# Patient Record
Sex: MALE | Race: WHITE
[De-identification: names, ages, dates, MRNs, and addresses within clinical notes are randomized per-mention and may not be internally consistent; named-entity substitution may affect disease eponyms.]

---

## 2021-09-27 ENCOUNTER — HOSPITAL ENCOUNTER (EMERGENCY)
Dept: HOSPITAL 60 - LB.ED | Age: 51
Discharge: HOME | End: 2021-09-27
Payer: COMMERCIAL

## 2021-09-27 DIAGNOSIS — M54.9: Primary | ICD-10-CM

## 2021-09-27 NOTE — EDM.PDOC
ED HPI GENERAL MEDICAL PROBLEM





- General


Chief Complaint: General


Stated Complaint: back pain


Time Seen by Provider: 09/27/21 18:12


Source of Information: Reports: Patient


History Limitations: Reports: No Limitations





- History of Present Illness


INITIAL COMMENTS - FREE TEXT/NARRATIVE: 





48-year-old male presents to the ED complaining of sciatic pain and spasms.  

Patient has been doing some yard work down in [place] approximately 5 days ago 

began to have a flare-up of his sciatica.  Patient relates that he had it was a 

gradual onset, massage/chiropractic improved the pain, wearing his gun belt 

increases the pain.  Patient describes the pain as a pulling and a stabbing 

sensation.  Pain radiates down the thigh to just above his knee.  He describes 

the pain as an 8/10 on the pain scale.  Positive for: Large wallet worn in the 

back pocket, spasm.  Negative for: Chest pain, shortness of breath, syncope/near

syncope, trauma, nausea/vomiting, diaphoresis, or neurological deficits.  

Patient relates that he does not know when the spasms will kick in but they are 

not a continual problem but intermittent.





- Related Data


Home Meds: 


                                    Home Meds





methocarbamoL [Methocarbamol] 500 mg PO QID #9 tablet 09/27/21 [Rx]


methylPREDNISolone [Medrol Dose Pack] 84 mg PO ASDIRECTED 6 Days #21 dospk 

09/27/21 [Rx]











Past Medical History


Musculoskeletal History: Reports: Back Pain, Chronic





Social & Family History





- Tobacco Use


Tobacco Use Status *Q: Never Tobacco User





ED ROS GENERAL





- Review of Systems


Review Of Systems: See Below


Constitutional: Reports: Other (Discontinued alcohol 1 year)


HEENT: Reports: No Symptoms


Respiratory: Reports: No Symptoms


Cardiovascular: Reports: No Symptoms


Endocrine: Reports: No Symptoms


GI/Abdominal: Reports: No Symptoms


: Reports: No Symptoms


Musculoskeletal: Reports: Back Pain


Skin: Reports: No Symptoms


Neurological: Reports: No Symptoms


Psychiatric: Reports: No Symptoms


Hematologic/Lymphatic: Reports: No Symptoms


Immunologic: Reports: No Symptoms





ED EXAM, GENERAL





- Physical Exam


Exam: See Below


Free Text/Narrative:: 





48-year-old male presents in the ED sitting at the edge of the stretcher in no 

apparent distress.  Patient is alert and oriented 3/3 GCS 4 5 6 skin is pink 

warm and dry.  Patient is speaking in full sentences


Exam Limited By: No Limitations


General Appearance: Alert, WD/WN, No Apparent Distress


Eye Exam: Bilateral Eye: EOMI, PERRL


Ears: Normal External Exam, Hearing Grossly Normal


Nose: No Blood


Throat/Mouth: Normal Lips, Normal Voice, No Airway Compromise


Head: Atraumatic, Normocephalic


Neck: Normal Inspection, Supple, Non-Tender, Full Range of Motion


Respiratory/Chest: No Respiratory Distress, Lungs Clear, Normal Breath Sounds, 

No Accessory Muscle Use, Chest Non-Tender


Cardiovascular: Normal Peripheral Pulses, Regular Rate, Rhythm, No Edema, No 

Gallop, No JVD, No Murmur, No Rub


GI/Abdominal: Normal Bowel Sounds, Soft, Non-Tender, No Organomegaly, No 

Distention, No Abnormal Bruit, No Mass


Back Exam: Normal Inspection, Full Range of Motion.  No: CVA Tenderness (R), CVA

 Tenderness (L), Muscle Spasm, Paraspinal Tenderness, Vertebral Tenderness


Extremities: Normal Inspection, Normal Range of Motion, Non-Tender, Normal 

Capillary Refill, No Pedal Edema


Neurological: Alert, Oriented, Normal Cognition, Normal Gait, No Motor/Sensory 

Deficits, Other (Slightly decreased patellar reflex on the left leg +1)


Skin Exam: Warm, Dry, Intact, Normal Color, No Rash


Lymphatic: No Adenopathy





Course





- Vital Signs


Last Recorded V/S: 





                                Last Vital Signs











Temp  98.0 F   09/27/21 18:24


 


Pulse  80   09/27/21 18:24


 


Resp  16   09/27/21 18:24


 


BP  158/118 H  09/27/21 18:24


 


Pulse Ox  98   09/27/21 18:24














- Orders/Labs/Meds


Meds: 





Medications














Discontinued Medications














Generic Name Dose Route Start Last Admin





  Trade Name Zoe  PRN Reason Stop Dose Admin


 


Ketorolac Tromethamine  Confirm  09/27/21 18:41  09/27/21 18:38





  Ketorolac 30 Mg/Ml Sdv  Administered  09/27/21 18:42  30 mg





  Dose   Administration





  30 mg  





  .ROUTE  





  .STK-MED ONE  


 


Ketorolac Tromethamine  30 mg  09/27/21 18:38 





  Ketorolac 30 Mg/Ml Sdv  IM  09/27/21 18:39 





  ONETIME ONE  














Departure





- Departure


Time of Disposition: 18:18


Disposition: Home, Self-Care 01


Condition: Good


Clinical Impression: 


 Back pain








- Discharge Information


*PRESCRIPTION DRUG MONITORING PROGRAM REVIEWED*: No


*COPY OF PRESCRIPTION DRUG MONITORING REPORT IN PATIENT REGGIE: No


Prescriptions: 


methylPREDNISolone [Medrol Dose Pack] 84 mg PO ASDIRECTED 6 Days #21 dospk


methocarbamoL [Methocarbamol] 500 mg PO QID #9 tablet


Instructions:  Managing Chronic Back Pain


Referrals: 


Kwabena Long PA [Primary Care Provider] - 


Forms:  ED Department Discharge


Care Plan Goals: 


Take medication as prescribed.  Follow up with a primary care provider.





Sepsis Event Note (ED)





- Focused Exam


Vital Signs: 





                                   Vital Signs











  Temp Pulse Resp BP Pulse Ox


 


 09/27/21 18:24  98.0 F  80  16  158/118 H  98














- Assessment/Plan


Assessment:: 





Assessment and plan: History, exam, treatment plan developed with shared 

decision makingto address patient's spasms patient is prescribed methocarbamol,

to address patient's pain he was given ketorolac IM, to address patient's 

sciatica he was given a Medrol dose pack, treatment plan was explained to 

patient all questions were answered to his satisfaction patient agreed to 

treatment plan.  Patient to establish primary care provider with Dr. Whalen in the 

[facility] for long-term care of his chronic back pain.  Patient was 

discharged in stable condition.